# Patient Record
Sex: MALE | Race: OTHER | Employment: FULL TIME | URBAN - METROPOLITAN AREA
[De-identification: names, ages, dates, MRNs, and addresses within clinical notes are randomized per-mention and may not be internally consistent; named-entity substitution may affect disease eponyms.]

---

## 2017-02-25 ENCOUNTER — HOSPITAL ENCOUNTER (EMERGENCY)
Age: 27
Discharge: HOME | End: 2017-02-25
Attending: EMERGENCY MEDICINE
Payer: COMMERCIAL

## 2017-02-25 VITALS
WEIGHT: 165 LBS | OXYGEN SATURATION: 99 % | DIASTOLIC BLOOD PRESSURE: 81 MMHG | TEMPERATURE: 98 F | RESPIRATION RATE: 18 BRPM | SYSTOLIC BLOOD PRESSURE: 137 MMHG | HEIGHT: 67 IN | HEART RATE: 78 BPM | BODY MASS INDEX: 25.9 KG/M2

## 2017-02-25 DIAGNOSIS — B35.6 TINEA CRURIS: ICD-10-CM

## 2017-02-25 PROCEDURE — 99282 EMERGENCY DEPT VISIT SF MDM: CPT

## 2017-02-25 NOTE — ED PROVIDER NOTES
Patient Seen in: THE [facility] Emergency Department In [place]    History   Patient presents with:  Cough/URI    Stated Complaint: cough    HPI    76-year-old male presents to the ER complaining of having a cough but also states that he has had some irritati on examination of the  system. The skin of the scrotum is slightly erythematous with dry areas noted. No lesions consistent with chancroid or syphilis. No lesions consistent with herpes.        ED Course   Labs Reviewed - No data to display    MDM   Th

## 2017-02-25 NOTE — ED NOTES
Patient here for a cough but by the way i also have penile irritation patient would like to get checked out for as well. Pt denies discharge or fevers.

## 2017-02-25 NOTE — ED INITIAL ASSESSMENT (HPI)
Pt here for a cough for a couple weeks. pt also c/o irritation red rash and dry penis after unprotected sex a month ago.

## 2018-05-16 ENCOUNTER — HOSPITAL ENCOUNTER (EMERGENCY)
Age: 28
Discharge: HOME | End: 2018-05-17
Attending: EMERGENCY MEDICINE
Payer: COMMERCIAL

## 2018-05-16 VITALS
SYSTOLIC BLOOD PRESSURE: 133 MMHG | RESPIRATION RATE: 18 BRPM | HEART RATE: 76 BPM | DIASTOLIC BLOOD PRESSURE: 80 MMHG | BODY MASS INDEX: 25.11 KG/M2 | TEMPERATURE: 99 F | OXYGEN SATURATION: 98 % | WEIGHT: 160 LBS | HEIGHT: 67 IN

## 2018-05-16 DIAGNOSIS — J02.9 PHARYNGITIS, UNSPECIFIED ETIOLOGY: Primary | ICD-10-CM

## 2018-05-16 PROCEDURE — 87081 CULTURE SCREEN ONLY: CPT | Performed by: EMERGENCY MEDICINE

## 2018-05-16 PROCEDURE — 99283 EMERGENCY DEPT VISIT LOW MDM: CPT

## 2018-05-16 PROCEDURE — 87430 STREP A AG IA: CPT

## 2018-05-17 RX ORDER — AZITHROMYCIN 250 MG/1
TABLET, FILM COATED ORAL
Qty: 1 PACKAGE | Refills: 0 | Status: SHIPPED | OUTPATIENT
Start: 2018-05-17 | End: 2018-05-22

## 2018-05-17 NOTE — ED INITIAL ASSESSMENT (HPI)
PT STS SORE THROAT X 2 DAYS. PT SWALLOWING W/O DIFFICULTY. NO DROOLING. PT STS TOOK MOTRIN 1 HOUR PRIOR TO ARRIVAL.

## 2018-05-17 NOTE — ED PROVIDER NOTES
Patient Seen in: [facility] In [place]    History   Patient presents with:  Sore Throat    Stated Complaint: sore throat    HPI    Worsening sore throat and some chills over the past 2 days. No headache or earache.   No cough or sputu in the next week or if other new problems occur.           Disposition and Plan     Clinical Impression:  Pharyngitis, unspecified etiology  (primary encounter diagnosis)    Disposition:  Discharge  5/17/2018 12:37 am    Follow-up:  No follow-up provider sp

## (undated) NOTE — ED AVS SNAPSHOT
THE [facility] Emergency Department in [address]    Phone:  [phone]    Fax:  [phone]           Welaka Severiano   MRN: [id number]    Department:  THE [facility] Emergency Department in [place]   Date of Visit: IF THERE IS ANY CHANGE OR WORSENING OF YOUR CONDITION, CALL YOUR PRIMARY CARE PHYSICIAN AT ONCE OR RETURN IMMEDIATELY TO THE EMERGENCY DEPARTMENT.     If you have been prescribed any medication(s), please fill your prescription right away and begin taking t

## (undated) NOTE — ED AVS SNAPSHOT
Ольга Clinton   MRN: [id number]    Department:  THE [facility] Emergency Department in [place]   Date of Visit:  5/16/2018           Disclosure     Insurance plans vary and the physician(s) referred by the ER may not be covered by your plan.  Please contact tell this physician (or your personal doctor if your instructions are to return to your personal doctor) about any new or lasting problems. The primary care or specialist physician will see patients referred from the [facility] Emergency Department.  Severo Pastures

## (undated) NOTE — ED AVS SNAPSHOT
[facility] in [address] [place] [address]    Phone:  [phone]    Fax:  [phone]           Ambreen Millero   MRN: [id number]    Department:  [facility] in [place]   Date of Visit: self-assessment the day after your visit. You may also receive a call from our patient liason soon after your visit. Also, some patients receive a detailed feedback survey mailed to them a week after the visit.   If you receive this, we would really apprec [facility] [address] [facility] [id number] [address] ([address]) [facility] [address] [facility]. ([address]) [facility] If you have questions, you can call [phone] to talk to our [facility] Staff. Remember, [employer] is NOT to be used for urgent needs. For medical emergencies, dial 911.